# Patient Record
Sex: MALE | Race: WHITE | URBAN - METROPOLITAN AREA
[De-identification: names, ages, dates, MRNs, and addresses within clinical notes are randomized per-mention and may not be internally consistent; named-entity substitution may affect disease eponyms.]

---
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
## 2022-01-27 NOTE — ED PEDIATRIC NURSE NOTE - PSH
No significant past surgical history
[facility]  Family Medicine  [address] [place] [id number]  Phone: [phone]  Fax:   Follow Up Time: 4-6 Days